# Patient Record
Sex: FEMALE | Race: BLACK OR AFRICAN AMERICAN
[De-identification: names, ages, dates, MRNs, and addresses within clinical notes are randomized per-mention and may not be internally consistent; named-entity substitution may affect disease eponyms.]

---

## 2017-01-30 ENCOUNTER — HOSPITAL ENCOUNTER (EMERGENCY)
Dept: HOSPITAL 17 - HOBED | Age: 42
Discharge: HOME | End: 2017-01-30
Payer: COMMERCIAL

## 2017-01-30 VITALS — SYSTOLIC BLOOD PRESSURE: 121 MMHG | HEART RATE: 85 BPM | DIASTOLIC BLOOD PRESSURE: 67 MMHG

## 2017-01-30 DIAGNOSIS — R10.30: ICD-10-CM

## 2017-01-30 DIAGNOSIS — M54.5: ICD-10-CM

## 2017-01-30 DIAGNOSIS — Z3A.31: ICD-10-CM

## 2017-01-30 DIAGNOSIS — O26.893: Primary | ICD-10-CM

## 2017-01-30 LAB
COLOR UR: YELLOW
COMMENT (UR): (no result)
CULTURE IF INDICATED: (no result)
GLUCOSE UR STRIP-MCNC: 70 MG/DL
HGB UR QL STRIP: (no result)
KETONES UR STRIP-MCNC: (no result) MG/DL
MUCOUS THREADS #/AREA URNS LPF: (no result) /LPF
NITRITE UR QL STRIP: (no result)
SP GR UR STRIP: 1.02 (ref 1–1.03)
SQUAMOUS #/AREA URNS HPF: 1 /HPF (ref 0–5)

## 2017-01-30 PROCEDURE — 81001 URINALYSIS AUTO W/SCOPE: CPT

## 2017-01-30 PROCEDURE — 96372 THER/PROPH/DIAG INJ SC/IM: CPT

## 2017-01-30 PROCEDURE — 99284 EMERGENCY DEPT VISIT MOD MDM: CPT

## 2017-01-30 NOTE — PD
HPI


Chief Complaint


Abdominal and back pain


Date Seen:  2017


Travel History


International Travel<30 Days:  No


Contact w/Intl Traveler<30Days:  No





History of Present Illness


HPI


This patient is 41-year-old black female  at 31 weeks patient Dr. brown 

presents complaining of abdominal and back pain.  She denies bleeding or 

rupture the membranes baby is active she is not tristen


Para:  1


:  4





History


Obstetric History


Obstetric History


One vaginal delivery 2 pregnancy losses





Family History


Family History:  Negative





Social History


Alcohol Use:  No


Tobacco Use:  No


Substance Abuse:  No





Allergies-Medications


(Allergen,Severity, Reaction):  


Coded Allergies:  


     Thorazine (Verified  Allergy, Severe, DYSTONIC, 16)


     Compazine (Verified  Adverse Reaction, Severe, DYSTONIC, 16)


Home Meds


Reported Medications


Meclizine 25 Mg Chew25 Mg CHEW AS DIRECTED PRN (VERTIGO)  Ref 0


   16


Gabapentin 100 Mg Tik167 Mg PO BID  #60 CAP  Ref 0


   16





Review of Systems


General / Constitutional:  No: Fever, Weight Gain, Chills, Other


Eyes:  No: Diploplia, Blurred Vision, Visual changes, Pain, Photophobia


HENT:  No: Headaches, Vertigo, Lightheadedness


Cardiovascular:  No: Irregular Rhythm, Chest Pain or Discomfort, Palpitations, 

Tachycardia, Syncope, Varicosities, Edema, Cyanosis


Respiratory:  No: Cough, Short of Breath, Other


Gastrointestinal:  Abdominal Pain,  No: Nausea, Vomiting, Diarrhea


Genitourinary:  Pelvic Pain,  No: Decreased Urinary Output, Oliguria


Musculoskeletal:  No: Limited ROM, Weakness, Cramping, Edema, Pain


Skin:  No Rash, No Itching, No Dryness, No Lumps, No Change in Pigmentation, No 

Change in Nails, No Alopecia, No Lesions


Neurologic:  No: Weakness, Dizziness, Syncope, Focal Abnormalities, 

Coordination Problem, Headache, Slurred Speech, Seizures


Psychiatric:  No: Depression, Suicidal Ideations, Homicidal Ideation


Endocrine:  No: Heat Intolerance, Cold Intolerance, Polydipsia, Polyuria, Other





Physical Exam


Narrative


GENERAL: Well-nourished, well-developed patient.


SKIN: Warm and dry.


HEAD: Normocephalic and atraumatic.


EYES: No scleral icterus. No injection or drainage. 


ENT: No nasal drainage noted. Mucous membranes pink. Airway patent.


NECK: Supple, trachea midline. No JVD.


CARDIOVASCULAR: Regular rate and rhythm without murmurs, gallops, or rubs. 


RESPIRATORY: Breath sounds equal bilaterally. No accessory muscle use.


BREASTS: Bilateral exam showed no masses , no retractions, no nipple discharge.


ABDOMEN/GI: Abdomen soft, non-tender, bowel sounds present, no rebound, no 

guarding 


   Gravid to [31-] weeks size


   Fundal Height: [30-]


GENITOURINARY: 


   External Genitalia: intact and normal in appearance


   BUS glands: [-]


   Cervix: [-Closed]


   Dilatation: [-] Closed          


   Effacement: [-]  Thick        


   Station: [-3]  


   Presentation: [-vtx]        


   Membranes: [intact  ]


   Uterine Contractions: [none-]


FHT's: 


   Category: [-1]   


   Baseline: [-144]   


   Reactive: [-yes]   


   Variability: [mod-]  


   Decels: [-none]  


EXTREMITIES: No cyanosis or edema.


BACK: Nontender without obvious deformity. No CVA tenderness.


NEUROLOGICAL: Awake and alert. Motor and sensory grossly within normal limits. 

Five out of 5 muscle strength in all muscle groups. Normal speech.





Data


Data


Orders





 Vital Signs (Adult) .ON ADMISSION (17 13:05)


^ Labor Status (17 13:05)


Urinalysis - C+S If Indicated (17 13:05)


Diet Liquid (17 Lunch)


Meperidine Inj (Demerol Inj) (17 13:15)


Promethazine Inj (Phenergan Inj) (17 13:15)


Labs





Laboratory Tests








Test 17





 13:10


 


Urine Color YELLOW 


 


Urine Turbidity CLEAR 


 


Urine pH 6.0 


 


Urine Specific Gravity 1.023 


 


Urine Protein TRACE 


 


Urine Glucose (UA) 70 


 


Urine Ketones NEG 


 


Urine Occult Blood NEG 


 


Urine Nitrite NEG 


 


Urine Bilirubin NEG 


 


Urine Urobilinogen LESS THAN 2.0 


 


Urine Leukocyte Esterase NEG 


 


Urine RBC 1 


 


Urine WBC 2 


 


Urine Squamous Epithelial 1 





Cells 


 


Urine Mucus FEW 


 


Microscopic Urinalysis Comment CULT NOT





 INDICATED











MDM


Interpretation(s)


This patient is a 31 week gestation  with lower abdominal pain and low 

back pain.  She has no CVA tenderness her urine is clear she has no 

contractions , fetal heart rate tracing is reactive


Plan


This patient's pain is likely muscular skeletal in origin as she has been 

working ongoing and needs several days of bedrest.  She is also given IM 

Demerol Phenergan for pain provide a work release


Diagnosis


Diagnosis:  


 Primary Impression:  


 Abdominal pain during pregnancy in third trimester


Disposition:  01 DISCHARGE HOME


Condition:  Stable


Departure Forms:  Work Release








Corona Becerra II, MD 2017 14:01

## 2017-03-28 ENCOUNTER — HOSPITAL ENCOUNTER (INPATIENT)
Dept: HOSPITAL 17 - H2EB | Age: 42
LOS: 4 days | Discharge: HOME | End: 2017-04-01
Payer: COMMERCIAL

## 2017-03-28 DIAGNOSIS — E66.9: ICD-10-CM

## 2017-03-28 DIAGNOSIS — G40.909: ICD-10-CM

## 2017-03-28 DIAGNOSIS — Z3A.40: ICD-10-CM

## 2017-03-28 DIAGNOSIS — O09.529: ICD-10-CM

## 2017-03-28 PROCEDURE — 86900 BLOOD TYPING SEROLOGIC ABO: CPT

## 2017-03-28 PROCEDURE — 85025 COMPLETE CBC W/AUTO DIFF WBC: CPT

## 2017-03-28 PROCEDURE — 81001 URINALYSIS AUTO W/SCOPE: CPT

## 2017-03-28 PROCEDURE — 90715 TDAP VACCINE 7 YRS/> IM: CPT

## 2017-03-28 PROCEDURE — 59025 FETAL NON-STRESS TEST: CPT

## 2017-03-28 PROCEDURE — 86850 RBC ANTIBODY SCREEN: CPT

## 2017-03-28 PROCEDURE — 86901 BLOOD TYPING SEROLOGIC RH(D): CPT

## 2017-03-28 PROCEDURE — 80048 BASIC METABOLIC PNL TOTAL CA: CPT

## 2017-03-28 PROCEDURE — 90707 MMR VACCINE SC: CPT

## 2017-03-28 NOTE — MH
cc:

NICK ENAMORADO M.D.

****

 

DATE OF ADMISSION

3/29/2017

 

DATE OF BIRTH

1975

 

ADMISSION DIAGNOSES

1. Term pregnancy.

2. Advanced maternal age, 41.

3. Short stature was cephalopelvic disporportion.

 

HISTORY OF PRESENT ILLNESS

The patient is 41-year-old single, black female para 1-0-3-1 with LMP of

2016, EDC of 2017. Early ultrasound at 8 weeks agrees with  EDC.

She had first delivery  weighing 5 pounds that was difficult. She had a

spontaneous  and two ETPs.  At term the EFW of this baby is about 2

pounds greater then  her first. She has a height of 4 feet 11 inches, small 
pelvimetry obesity.

She is now admitted primary .

 

PAST MEDICAL HISTORY

1. Previous surgery in  she had C. difficile colitis.

2. Has a history of seizure disorder.

 

MEDICATIONS

1. Gabapentin.

2. Meclizine.

 

ALLERGIES

COMPAZINE AND THORAZINE.

 

TRANSFUSIONS

None.

 

SOCIAL HISTORY

She is a teacher, elementary school. Alcohol, tobacco, drugs are none.

 

PHYSICAL EXAMINATION

GENERAL:  Well-nourished, well-developed, white female.

VITAL SIGNS: Stable. Height is 4'11", weight is 194 pounds.

HEENT:  Examination is normal.

CHEST:  Clear.

HEART:  Regular rate.

BREASTS:  Symmetrical.

ABDOMEN:  Gravid. Estimated fetal weight is 3400 grams.

PELVIC:   Cervix is long, thick and closed. Vertex is ballotable.

ASSESSMENT

As above.

 

PLAN

She is now admitted for a primary . While in the office I explained

the procedures, the risks, benefits and complications. The patient would like

to proceed.

 

 

 

 

                               __________________________________

                           MD CORY Mary/HATTIE

D:  3/28/2017/7:29 PM

T:  3/28/2017/7:43 PM

Visit #:  [id number]

Job #:  [id number]

TREY

## 2017-03-29 VITALS
DIASTOLIC BLOOD PRESSURE: 44 MMHG | RESPIRATION RATE: 16 BRPM | SYSTOLIC BLOOD PRESSURE: 122 MMHG | HEART RATE: 92 BPM | OXYGEN SATURATION: 98 %

## 2017-03-29 VITALS
HEART RATE: 82 BPM | RESPIRATION RATE: 16 BRPM | DIASTOLIC BLOOD PRESSURE: 74 MMHG | OXYGEN SATURATION: 99 % | SYSTOLIC BLOOD PRESSURE: 145 MMHG

## 2017-03-29 VITALS
RESPIRATION RATE: 15 BRPM | HEART RATE: 78 BPM | DIASTOLIC BLOOD PRESSURE: 77 MMHG | OXYGEN SATURATION: 98 % | SYSTOLIC BLOOD PRESSURE: 143 MMHG

## 2017-03-29 VITALS
RESPIRATION RATE: 18 BRPM | HEART RATE: 85 BPM | OXYGEN SATURATION: 97 % | DIASTOLIC BLOOD PRESSURE: 63 MMHG | SYSTOLIC BLOOD PRESSURE: 120 MMHG

## 2017-03-29 VITALS
SYSTOLIC BLOOD PRESSURE: 102 MMHG | RESPIRATION RATE: 18 BRPM | DIASTOLIC BLOOD PRESSURE: 59 MMHG | HEART RATE: 85 BPM | OXYGEN SATURATION: 97 %

## 2017-03-29 VITALS
HEART RATE: 101 BPM | TEMPERATURE: 97.8 F | DIASTOLIC BLOOD PRESSURE: 69 MMHG | RESPIRATION RATE: 18 BRPM | SYSTOLIC BLOOD PRESSURE: 148 MMHG | OXYGEN SATURATION: 99 %

## 2017-03-29 VITALS — TEMPERATURE: 97.4 F

## 2017-03-29 LAB
BACTERIA #/AREA URNS HPF: (no result) /HPF
BASOPHILS # BLD AUTO: 0 TH/MM3 (ref 0–0.2)
BASOPHILS NFR BLD: 0.4 % (ref 0–2)
COLOR UR: YELLOW
COMMENT (UR): (no result)
CULTURE IF INDICATED: (no result)
EOSINOPHIL # BLD: 0.2 TH/MM3 (ref 0–0.4)
EOSINOPHIL NFR BLD: 1.6 % (ref 0–4)
ERYTHROCYTE [DISTWIDTH] IN BLOOD BY AUTOMATED COUNT: 15.5 % (ref 11.6–17.2)
GLUCOSE UR STRIP-MCNC: (no result) MG/DL
HCT VFR BLD CALC: 36.4 % (ref 35–46)
HEMO FLAGS: (no result)
HGB UR QL STRIP: (no result)
KETONES UR STRIP-MCNC: (no result) MG/DL
LYMPHOCYTES # BLD AUTO: 1.7 TH/MM3 (ref 1–4.8)
LYMPHOCYTES NFR BLD AUTO: 17 % (ref 9–44)
MCH RBC QN AUTO: 27.1 PG (ref 27–34)
MCHC RBC AUTO-ENTMCNC: 32.2 % (ref 32–36)
MCV RBC AUTO: 84.3 FL (ref 80–100)
MONOCYTES NFR BLD: 9.5 % (ref 0–8)
MUCOUS THREADS #/AREA URNS LPF: (no result) /LPF
NEUTROPHILS # BLD AUTO: 6.9 TH/MM3 (ref 1.8–7.7)
NEUTROPHILS NFR BLD AUTO: 71.5 % (ref 16–70)
NITRITE UR QL STRIP: (no result)
PLATELET # BLD: 288 TH/MM3 (ref 150–450)
RBC # BLD AUTO: 4.32 MIL/MM3 (ref 4–5.3)
SP GR UR STRIP: 1.01 (ref 1–1.03)
SQUAMOUS #/AREA URNS HPF: 2 /HPF (ref 0–5)
WBC # BLD AUTO: 9.7 TH/MM3 (ref 4–11)

## 2017-03-29 RX ADMIN — ACETAMINOPHEN SCH MG: 10 INJECTION, SOLUTION INTRAVENOUS at 22:04

## 2017-03-29 RX ADMIN — ACETAMINOPHEN SCH MG: 10 INJECTION, SOLUTION INTRAVENOUS at 14:15

## 2017-03-30 VITALS
SYSTOLIC BLOOD PRESSURE: 111 MMHG | RESPIRATION RATE: 16 BRPM | HEART RATE: 65 BPM | DIASTOLIC BLOOD PRESSURE: 77 MMHG | TEMPERATURE: 98.6 F

## 2017-03-30 VITALS
RESPIRATION RATE: 18 BRPM | HEART RATE: 64 BPM | TEMPERATURE: 98 F | SYSTOLIC BLOOD PRESSURE: 117 MMHG | DIASTOLIC BLOOD PRESSURE: 82 MMHG

## 2017-03-30 LAB
ANION GAP SERPL CALC-SCNC: 9 MEQ/L (ref 5–15)
BASOPHILS # BLD AUTO: 0 TH/MM3 (ref 0–0.2)
BASOPHILS NFR BLD: 0.3 % (ref 0–2)
BUN SERPL-MCNC: 3 MG/DL (ref 7–18)
CHLORIDE SERPL-SCNC: 105 MEQ/L (ref 98–107)
EOSINOPHIL # BLD: 0.1 TH/MM3 (ref 0–0.4)
EOSINOPHIL NFR BLD: 0.8 % (ref 0–4)
ERYTHROCYTE [DISTWIDTH] IN BLOOD BY AUTOMATED COUNT: 16.1 % (ref 11.6–17.2)
GFR SERPLBLD BASED ON 1.73 SQ M-ARVRAT: 166 ML/MIN (ref 89–?)
HCO3 BLD-SCNC: 24.2 MEQ/L (ref 21–32)
HCT VFR BLD CALC: 30.3 % (ref 35–46)
HEMO FLAGS: (no result)
LYMPHOCYTES # BLD AUTO: 1.8 TH/MM3 (ref 1–4.8)
LYMPHOCYTES NFR BLD AUTO: 13.4 % (ref 9–44)
MCH RBC QN AUTO: 27.7 PG (ref 27–34)
MCHC RBC AUTO-ENTMCNC: 32.8 % (ref 32–36)
MCV RBC AUTO: 84.3 FL (ref 80–100)
MONOCYTES NFR BLD: 8.3 % (ref 0–8)
NEUTROPHILS # BLD AUTO: 10.5 TH/MM3 (ref 1.8–7.7)
NEUTROPHILS NFR BLD AUTO: 77.2 % (ref 16–70)
PLATELET # BLD: 243 TH/MM3 (ref 150–450)
POTASSIUM SERPL-SCNC: 3.4 MEQ/L (ref 3.5–5.1)
RBC # BLD AUTO: 3.59 MIL/MM3 (ref 4–5.3)
SODIUM SERPL-SCNC: 138 MEQ/L (ref 136–145)
WBC # BLD AUTO: 13.6 TH/MM3 (ref 4–11)

## 2017-03-30 RX ADMIN — GABAPENTIN SCH MG: 100 CAPSULE ORAL at 17:56

## 2017-03-30 RX ADMIN — GABAPENTIN SCH MG: 100 CAPSULE ORAL at 13:00

## 2017-03-30 RX ADMIN — GABAPENTIN SCH MG: 100 CAPSULE ORAL at 09:12

## 2017-03-30 RX ADMIN — STANDARDIZED SENNA CONCENTRATE AND DOCUSATE SODIUM PRN TAB: 8.6; 5 TABLET, FILM COATED ORAL at 04:06

## 2017-03-30 RX ADMIN — IBUPROFEN PRN MG: 600 TABLET, FILM COATED ORAL at 11:30

## 2017-03-30 RX ADMIN — STANDARDIZED SENNA CONCENTRATE AND DOCUSATE SODIUM PRN TAB: 8.6; 5 TABLET, FILM COATED ORAL at 18:11

## 2017-03-30 RX ADMIN — IBUPROFEN PRN MG: 600 TABLET, FILM COATED ORAL at 17:58

## 2017-03-30 RX ADMIN — IBUPROFEN PRN MG: 600 TABLET, FILM COATED ORAL at 04:05

## 2017-03-30 RX ADMIN — ACETAMINOPHEN SCH MG: 10 INJECTION, SOLUTION INTRAVENOUS at 05:16

## 2017-03-30 RX ADMIN — OXYCODONE HYDROCHLORIDE AND ACETAMINOPHEN PRN TAB: 5; 325 TABLET ORAL at 23:02

## 2017-03-30 RX ADMIN — OXYCODONE HYDROCHLORIDE AND ACETAMINOPHEN PRN TAB: 5; 325 TABLET ORAL at 18:00

## 2017-03-31 VITALS
TEMPERATURE: 98.4 F | HEART RATE: 76 BPM | RESPIRATION RATE: 20 BRPM | SYSTOLIC BLOOD PRESSURE: 128 MMHG | DIASTOLIC BLOOD PRESSURE: 86 MMHG

## 2017-03-31 RX ADMIN — OXYCODONE HYDROCHLORIDE AND ACETAMINOPHEN PRN TAB: 5; 325 TABLET ORAL at 03:57

## 2017-03-31 RX ADMIN — STANDARDIZED SENNA CONCENTRATE AND DOCUSATE SODIUM PRN TAB: 8.6; 5 TABLET, FILM COATED ORAL at 20:56

## 2017-03-31 RX ADMIN — OXYCODONE HYDROCHLORIDE AND ACETAMINOPHEN PRN TAB: 5; 325 TABLET ORAL at 08:15

## 2017-03-31 RX ADMIN — OXYCODONE HYDROCHLORIDE AND ACETAMINOPHEN PRN TAB: 5; 325 TABLET ORAL at 16:09

## 2017-03-31 RX ADMIN — GABAPENTIN SCH MG: 100 CAPSULE ORAL at 07:20

## 2017-03-31 RX ADMIN — OXYCODONE HYDROCHLORIDE AND ACETAMINOPHEN PRN TAB: 5; 325 TABLET ORAL at 20:57

## 2017-03-31 RX ADMIN — STANDARDIZED SENNA CONCENTRATE AND DOCUSATE SODIUM PRN TAB: 8.6; 5 TABLET, FILM COATED ORAL at 03:56

## 2017-03-31 RX ADMIN — IBUPROFEN PRN MG: 600 TABLET, FILM COATED ORAL at 16:08

## 2017-03-31 RX ADMIN — OXYCODONE HYDROCHLORIDE AND ACETAMINOPHEN PRN TAB: 5; 325 TABLET ORAL at 12:04

## 2017-03-31 RX ADMIN — GABAPENTIN SCH MG: 100 CAPSULE ORAL at 08:16

## 2017-03-31 RX ADMIN — IBUPROFEN PRN MG: 600 TABLET, FILM COATED ORAL at 09:45

## 2017-03-31 RX ADMIN — GABAPENTIN SCH MG: 100 CAPSULE ORAL at 17:46

## 2017-03-31 RX ADMIN — IBUPROFEN PRN MG: 600 TABLET, FILM COATED ORAL at 03:57

## 2017-04-01 VITALS
SYSTOLIC BLOOD PRESSURE: 138 MMHG | TEMPERATURE: 97.8 F | RESPIRATION RATE: 18 BRPM | HEART RATE: 67 BPM | DIASTOLIC BLOOD PRESSURE: 85 MMHG

## 2017-04-01 RX ADMIN — OXYCODONE HYDROCHLORIDE AND ACETAMINOPHEN PRN TAB: 5; 325 TABLET ORAL at 01:17

## 2017-04-01 RX ADMIN — IBUPROFEN PRN MG: 600 TABLET, FILM COATED ORAL at 01:17

## 2017-04-01 RX ADMIN — IBUPROFEN PRN MG: 600 TABLET, FILM COATED ORAL at 08:03

## 2017-04-01 RX ADMIN — IBUPROFEN PRN MG: 600 TABLET, FILM COATED ORAL at 16:04

## 2017-04-01 RX ADMIN — OXYCODONE HYDROCHLORIDE AND ACETAMINOPHEN PRN TAB: 5; 325 TABLET ORAL at 05:14

## 2017-04-01 RX ADMIN — OXYCODONE HYDROCHLORIDE AND ACETAMINOPHEN PRN TAB: 5; 325 TABLET ORAL at 11:08

## 2017-04-01 RX ADMIN — OXYTOCIN SCH MLS/HR: 10 INJECTION, SOLUTION INTRAMUSCULAR; INTRAVENOUS at 15:05

## 2017-04-01 RX ADMIN — OXYTOCIN SCH MLS/HR: 10 INJECTION, SOLUTION INTRAMUSCULAR; INTRAVENOUS at 08:25

## 2017-04-01 NOTE — HHI.DCPOC
Discharge Care Plan


Report Symptoms to Your Doctor


-Temperate above 100.5 degrees


-Redness, of incision or excessive or foul smelling drainage


-Unusual pain or calf pain


-Increased vaginal bleeding


-Painful or difficulty urinating


-Feelings of extreme sadness or anxiety after 2 weeks


Goals to Promote Your Health


* To prevent worsening of your condition and complications


* To maintain your health at the optimal level


Directions to Meet Your Goals


*** Take your medications as prescribed


*** Follow your dietary instruction


*** Follow activity as directed


*** Ensure plenty of rest for recovery


*** Drink fluids for hydration








*** Keep your appointments as scheduled


*** Take your immunizations and boosters as scheduled


*** If your symptoms worsen call your PCP, if no PCP go to Urgent Care Center 

or Emergency Room***


*** Smoking is Dangerous to Your Health. Avoid second hand smoke***


***Call the 24-hour crisis hotline for domestic abuse at 1-651.836.7737***








Eliot Mcdowell MD Apr 1, 2017 12:39

## 2017-04-02 ENCOUNTER — HOSPITAL ENCOUNTER (EMERGENCY)
Dept: HOSPITAL 17 - HOBED | Age: 42
Discharge: HOME | End: 2017-04-02
Payer: COMMERCIAL

## 2017-04-02 LAB
ALP SERPL-CCNC: 138 U/L (ref 45–117)
ALT SERPL-CCNC: 31 U/L (ref 10–53)
ANION GAP SERPL CALC-SCNC: 7 MEQ/L (ref 5–15)
AST SERPL-CCNC: 40 U/L (ref 15–37)
BILIRUB SERPL-MCNC: 0.2 MG/DL (ref 0.2–1)
BUN SERPL-MCNC: 3 MG/DL (ref 7–18)
CHLORIDE SERPL-SCNC: 106 MEQ/L (ref 98–107)
GFR SERPLBLD BASED ON 1.73 SQ M-ARVRAT: 143 ML/MIN (ref 89–?)
HCO3 BLD-SCNC: 27.8 MEQ/L (ref 21–32)
POTASSIUM SERPL-SCNC: 3.5 MEQ/L (ref 3.5–5.1)
SODIUM SERPL-SCNC: 141 MEQ/L (ref 136–145)
URATE SERPL-MCNC: 3.5 MG/DL (ref 2.6–6)

## 2017-04-02 PROCEDURE — 93970 EXTREMITY STUDY: CPT

## 2017-04-02 PROCEDURE — 84550 ASSAY OF BLOOD/URIC ACID: CPT

## 2017-04-02 PROCEDURE — 80053 COMPREHEN METABOLIC PANEL: CPT

## 2017-04-02 PROCEDURE — 99284 EMERGENCY DEPT VISIT MOD MDM: CPT

## 2017-04-02 NOTE — RADRPT
EXAM DATE/TIME:  2017 04:00 

 

[facility] COMPARISON:     

No previous studies available for comparison.

        

 

 

INDICATIONS :                

Bilateral leg swelling. 

            

 

MEDICAL HISTORY :        

Seizures. Gastritis. 

 

SURGICAL HISTORY :     

 section. Right hand surgery. 

 

ENCOUNTER:     

Initial

 

ACUITY:     

1 day

 

PAIN SCORE:      

5/10

 

LOCATION:      

Bilateral  legs. 

                       

 

TECHNIQUE:     

Venous ultrasound of the left and right leg was performed from the inguinal ligament to the proximal 
calf.  Real-time, color Doppler and spectral tracing, compression and augmentation techniques were us
ed.  

 

FINDINGS:     

 

RIGHT LEG:     

There is normal compressibility of the deep venous system from the inguinal region to the proximal ca
lf.  No echogenic clot is seen in the lumen of the common femoral, femoral, popliteal, and posterior 
tibial veins.  There is a normal response of the venous system to proximal and distal augmentation an
d respiration.  

 

LEFT LEG:     

There is normal compressibility of the deep venous system from the inguinal region to the proximal ca
lf.  No echogenic clot is seen in the lumen of the common femoral, femoral, popliteal, and posterior 
tibial veins.  There is a normal response of the venous system to proximal and distal augmentation an
d respiration.  

 

CONCLUSION:     

No DVT is identified within either lower extremity.

 

 

 

 Eliot López MD on 2017 at 4:42           

Board Certified Radiologist.

 This report was verified electronically.

## 2017-04-02 NOTE — PD
HPI


Chief Complaint


Swelling, and pain in her calves


Date Seen:  2017


Travel History


International Travel<30 Days:  No


Contact w/Intl Traveler<30Days:  No


Known Affected Area:  No





History of Present Illness


HPI


41-year-old black female who is 1 week post  by Dr. brown presents now 

to the emergency room with bilateral calf and thigh pain and swelling in her 

legs and buttocks borderline blood pressure elevation


Para:  2


:  2





History


Obstetric History


Obstetric History


2 C-sections





Past Surgical History


*** Narrative Surgical


2 C-sections





Social History


Alcohol Use:  No


Tobacco Use:  No


Substance Abuse:  No





Allergies-Medications


(Allergen,Severity, Reaction):  


Coded Allergies:  


     Thorazine (Verified  Allergy, Severe, DYSTONIC, 17)


     Compazine (Verified  Adverse Reaction, Severe, DYSTONIC, 17)


Home Meds


Reported Medications


Meclizine 25 Mg Chew25 Mg CHEW AS DIRECTED PRN (VERTIGO)  Ref 0


   3/29/17


Gabapentin 100 Mg [id number] Mg PO TID  #90 CAP  Ref 0


   3/29/17


Discontinued Reported Medications


Gabapentin 100 Mg Eto343 Mg PO BID  #60 CAP  Ref 0


   16





Review of Systems


General / Constitutional:  No: Fever, Weight Gain, Chills, Other


Eyes:  No: Diploplia, Blurred Vision, Visual changes, Pain, Photophobia


HENT:  No: Headaches, Vertigo, Lightheadedness


Cardiovascular:  Edema,  No: Irregular Rhythm, Chest Pain or Discomfort, 

Palpitations, Tachycardia, Syncope, Varicosities, Cyanosis


Respiratory:  No: Cough, Short of Breath, Other


Gastrointestinal:  No: Nausea, Vomiting, Diarrhea


Genitourinary:  No: Decreased Urinary Output, Oliguria


Musculoskeletal:  Edema, Pain,  No: Limited ROM, Weakness, Cramping


Skin:  No Rash, No Itching, No Dryness, No Lumps, No Change in Pigmentation, No 

Change in Nails, No Alopecia, No Lesions


Neurologic:  No: Weakness, Dizziness, Syncope, Focal Abnormalities, 

Coordination Problem, Headache, Slurred Speech, Seizures


Psychiatric:  No: Depression, Suicidal Ideations, Homicidal Ideation


Endocrine:  No: Heat Intolerance, Cold Intolerance, Polydipsia, Polyuria, Other





Physical Exam


Narrative


GENERAL: Well-nourished, well-developed patient.


SKIN: Warm and dry.


HEAD: Normocephalic and atraumatic.


EYES: No scleral icterus. No injection or drainage. 


ENT: No nasal drainage noted. Mucous membranes pink. Airway patent.


NECK: Supple, trachea midline. No JVD.


CARDIOVASCULAR: Regular rate and rhythm without murmurs, gallops, or rubs. 


RESPIRATORY: Breath sounds equal bilaterally. No accessory muscle use.


BREASTS: Bilateral exam showed no masses , no retractions, no nipple discharge.


ABDOMEN/GI: Abdomen soft, -tender, bowel sounds present, no rebound, no 

guarding uterus at umb


   


  : [-]  


EXTREMITIES: No cyanosis,  3+ edema. calves are swollen and tender, no homans





 





 


BACK: Nontender without obvious deformity. No CVA tenderness.


NEUROLOGICAL: Awake and alert. Motor and sensory grossly within normal limits. 

Five out of 5 muscle strength in all muscle groups. Normal speech.





Data


Data


Orders





 Activity Bed Rest (17 03:20)


^ Notify Dr. Hawkins (17 03:20)


Cbc No Diff, Includes Plts (4/3/17 06:00)


Comprehensive Metabolic Panel (17 03:20)


Uric Acid (17 03:20)


Urinalysis - C+S If Indicated (17 03:20)


Us Leg Venous Doppler Bilat (17 )


Labs


venous dopplers neg for DVT


Laboratory Tests








Test 17





 03:45


 


Sodium Level 141 


 


Potassium Level 3.5 


 


Chloride Level 106 


 


Carbon Dioxide Level 27.8 


 


Anion Gap 7 


 


Blood Urea Nitrogen 3 


 


Creatinine 0.57 


 


Estimat Glomerular Filtration 143 





Rate 


 


Random Glucose 87 


 


Uric Acid 3.5 


 


Calcium Level 8.6 


 


Total Bilirubin 0.2 


 


Aspartate Amino Transf 40 





(AST/SGOT) 


 


Alanine Aminotransferase 31 





(ALT/SGPT) 


 


Alkaline Phosphatase 138 


 


Total Protein 6.5 


 


Albumin 2.4 











MDM


Interpretation(s)


This patient is a 41-year-old black female  is 1 week status post C-

section.  She presented with calf pain swelling edema ,swelling in her hips and 

legs, borderline blood pressures.  All [facility] labs within normal limits, she had 

bilateral venous Dopplers of her legs from groin to the calf in all negative 

for clot  or DVT.  Dr. brown her  a week ago and he knows about her 

events this evening and increase it to she may be able to go home as long as 

all her workup is negative


Plan


Plan for patient to be discharged on HCTZ for swelling to try and mobilize some 

of the fluid in the diurese that, she has an appointment schedule see Dr. brown 

the near future


Diagnosis


Diagnosis:  


 Primary Impression:  


 Edema during pregnancy, delivered, with postpartum complication


Disposition:  01 DISCHARGE HOME


Condition:  Stable


Scripts


Hydrochlorothiazide 50 Mg Tab50 Mg PO DAILY  #60 TAB  Ref 0


   Prov:Coroan Becerra II, MD         17


Patient Instructions:  General Instructions





**Additional Instructions**:


KEEP FOLLOWUP APPOINTMENT WITH PRIMARY CARE GIVER


Departure Forms:  Tests/Procedures








Corona Becerra II, MD 2017 06:11

## 2017-04-03 NOTE — MP
cc:

NICK ENAMORADO M.D.

****

 

DATE OF SURGERY

3/29/17

 

PREOPERATIVE DIAGNOSIS

1.  Term pregnancy

2.  Advanced maternal age 41

3.  Sinusitis portion with short stature a small pelvimetry

 

POSTOPERATIVE DIAGNOSIS

1.  Term pregnancy

2.  Advanced maternal age 41

3.  Sinusitis portion with short stature a small pelvimetry

4.  Plus delivered plus occiput posterior

 

PROCEDURE

Primary low transverse  section.

 

ANESTHESIA

Spinal

 

SURGEON

LUIS Enamorado MD

 

FIRST ASSISTANT

ALETA Kam.

 

ESTIMATED BLOOD LOSS

500 mL

 

FLUIDS

1.2 liters crystalloid.

 

OBJECTIVE FINDINGS

Following induction of adequate spinal anesthesia, the patient was prepped and

draped supine on the operating table left lateral tilt position usual sterile

fashion with the bladder being drained by Veliz catheterization.  The abdomen

was opened through a Pfannenstiel incision using a knife to cut down through

from the skin to the fascia.  The fascia opened transversely and stripped from

the muscles.  Rectus muscle split in the midline and the peritoneum opened

sharp without incident.   The bladder flap was taken down sharply and retracted

inferiorly with the Soraida blade.  The lower uterine segment was incised

transversely with a knife and extended with blunt dissection.  Membranes

ruptured revealing clear fluid.  Baby was in  LOP position, gently rotated. The

vacuum applied to the occiput  and used to lift the head through the abdominal

wound.  Mouth was suctioned, cord clamped and cut.  Baby passed to the 

team, viable vigorous female Apgars 9 and 9, birth weight 6 pounds 13 ounces.

Cord blood sent for typing, placenta manually removed and uterine cavity

cleaned with laps. Uterus was exteriorized and closed in two layers with

running suture, first with running locking stitch of 0 Vicryl, second running

imbricating stitch of Vicryl.  Posterior inspection of the uterus, tubes and

ovaries normal.  Uterus  now placed in cavity.  Irrigation was performed, no

bleeding was evident and the bladder flap was closed with running stitch 3-0

Vicryl.  All laps and retractors removed.  Counts were correct.  The anterior

peritoneum closed running stitch of 2-0 Vicryl.  The fascia closed with running

locking stitch of 0 Vicryl corner to midline and tied, subcu with runnin 3-0

Vicryl and skin with subcuticular Monocryl.  Dermabond applied.  Counts

correct.  The patient was awaken and taken to recovery room in good condition.

 

 

 

 

 

                              _________________________________

                              MD CORY Mary/

D:  3/29/2017/3:12 PM

T:  4/3/2017/8:55 AM

Visit #:  [id number]

Job #:  [id number]

TREY

## 2018-01-29 ENCOUNTER — HOSPITAL ENCOUNTER (EMERGENCY)
Dept: HOSPITAL 17 - NEPD | Age: 43
Discharge: HOME | End: 2018-01-29
Payer: COMMERCIAL

## 2018-01-29 VITALS — WEIGHT: 176.37 LBS | BODY MASS INDEX: 35.56 KG/M2 | HEIGHT: 59 IN

## 2018-01-29 VITALS
HEART RATE: 73 BPM | DIASTOLIC BLOOD PRESSURE: 85 MMHG | TEMPERATURE: 98.2 F | SYSTOLIC BLOOD PRESSURE: 141 MMHG | RESPIRATION RATE: 18 BRPM | OXYGEN SATURATION: 99 %

## 2018-01-29 DIAGNOSIS — J10.1: Primary | ICD-10-CM

## 2018-01-29 PROCEDURE — 99283 EMERGENCY DEPT VISIT LOW MDM: CPT

## 2018-01-29 NOTE — PD
HPI


Chief Complaint:  Cold / Flu Symptoms


Time Seen by Provider:  08:30


Travel History


International Travel<30 days:  No


Contact w/Intl Traveler<30days:  No


Traveled to known affect area:  No





History of Present Illness


HPI


42-year-old Afro-American female presents the emergency department with flulike 

symptoms over the past 24 hours.  She's had achy chills, sore throat, upper 

respiratory congestion, cough, and fevers.  Patient is a schoolteacher, and her 

significant other had H1N1 2 weeks ago.  Patient denies nausea vomiting or 

diarrhea.  She is allergic to chlorpromethazine and prochlorperazine.





PFSH


Past Medical History


Cancer:  No


Cardiovascular Problems:  No


Diminished Hearing:  No


Endocrine:  No


Gastrointestinal Disorders:  Yes (gastritis)


Genitourinary:  No


Immune Disorder:  No


Implanted Vascular Access Dvce:  No


Musculoskeletal:  No


Neurologic:  Yes (seizures )


Psychiatric:  No


Reproductive:  No


Respiratory:  No


Immunizations Current:  No


Migraines:  Yes


Seizures:  Yes


Pregnant?:  Not Pregnant


:  3


Para:  1


Miscarriage:  1


:  1


Dilation and Curettage (D&C):  Yes





Past Surgical History


Pacemaker:  No


Other Surgery:  Yes (RT HAND SURGERY)





Social History


Alcohol Use:  No


Tobacco Use:  No


Substance Use:  No





Allergies-Medications


(Allergen,Severity, Reaction):  


Coded Allergies:  


     chlorpromazine (Unverified  Allergy, Severe, DYSTONIC, 18)


     prochlorperazine (Unverified  Adverse Reaction, Severe, DYSTONIC, 18)


Reported Meds & Prescriptions





Reported Meds & Active Scripts


Active


Tamiflu (Oseltamivir Phosphate) 75 Mg Cap 75 Mg PO BID 5 Days


Hydrochlorothiazide 50 Mg Tab 50 Mg PO DAILY


Reported


Gabapentin 100 Mg Cap 100 Mg PO TID








Review of Systems


Except as stated in HPI:  all other systems reviewed are Neg


General / Constitutional:  Positive: Fever


Eyes:  No: Visual changes


HENT:  Positive: Headaches, Sore Throat, Rhinitis, Rhinorrhea, Congestion, No: 

Vertigo, Lightheadedness, Nosebleed, Neck Stiffness, Neck Pain, Dental 

Difficulties, Earache


Cardiovascular:  No: Chest Pain or Discomfort


Respiratory:  Positive: Cough, No: Shortness of Breath, Wheezing


Gastrointestinal:  No: Nausea, Vomiting, Diarrhea, Abdominal Pain


Genitourinary:  No: Dysuria


Musculoskeletal:  Positive: Myalgias, No: Arthralgias, Limited ROM, Pain


Skin:  No Rash


Neurologic:  No: Weakness


Psychiatric:  No: Depression


Endocrine:  No: Polydipsia


Hematologic/Lymphatic:  No: Easy Bruising





Physical Exam


Narrative


GENERAL: He appears ill but not septic.    


SKIN: Warm and dry.


HEAD: Atraumatic. Normocephalic. 


EYES: Pupils equal and round. No scleral icterus. No injection or drainage. 


ENT: No nasal bleeding with moderate clear nasal discharge.  Mucous membranes 

pink and moist.  TMs are dull bilaterally but no injection.  Pharynx is mildly 

erythematous without significant swelling or tonsillitis.  Uvula is midline.


NECK: Trachea midline. No JVD.  Supple nontender


CARDIOVASCULAR: Regular rate and rhythm.  


RESPIRATORY: No accessory muscle use. Clear to auscultation. Breath sounds 

equal bilaterally. 


GASTROINTESTINAL: Abdomen soft, non-tender, nondistended. Hepatic and splenic 

margins not palpable. 


MUSCULOSKELETAL: Extremities without clubbing, cyanosis, or edema. No obvious 

deformities. 


NEUROLOGICAL: Awake and alert. No obvious cranial nerve deficits.  Motor 

grossly within normal limits. Five out of 5 muscle strength in the arms and 

legs.  Normal speech.


PSYCHIATRIC: Appropriate mood and affect; insight and judgment normal.





Data


Data


Last Documented VS





Vital Signs








  Date Time  Temp Pulse Resp B/P (MAP) Pulse Ox O2 Delivery O2 Flow Rate FiO2


 


18 08:18 98.2 73 18 141/85 (103) 99 Room Air  











MDM


Medical Decision Making


Medical Screen Exam Complete:  Yes


Emergency Medical Condition:  Yes


Differential Diagnosis


Febrile illness.  Viral syndrome.  Influenza.


Narrative Course


Patient is presumed to have H1N1 influenza.


Patient treated with Tamiflu 75 mg twice a day 5 days.


Take Tylenol, ibuprofen, and push fluids as discussed.


No for work given.


Follow up if symptoms worsen as needed.





Diagnosis





 Primary Impression:  


 H1N1 influenza


Referrals:  


Primary Care Physician


Patient Instructions:  General Instructions


Departure Forms:  Work Release   


   Special Instructions:  Patient has the flu.


      Patient should not return to work until fever free for 24 hours.





***Additional Instructions:  


Patient is presumed to have H1N1 influenza.


Patient treated with Tamiflu 75 mg twice a day 5 days.


Take Tylenol, ibuprofen, and push fluids as discussed.


No for work given.


Follow up if symptoms worsen as needed.


***Med/Other Pt SpecificInfo:  Prescription(s) given


Scripts


Oseltamivir (Tamiflu) 75 Mg Cap


75 MG PO BID for Mgmt Viral Infection for 5 Days, #10 CAP 0 Refills


   Prov: Jhon Rain MD         18


Disposition:  01 DISCHARGE HOME


Condition:  Stable











Juan Pablo Hunter 2018 08:44

## 2018-04-26 ENCOUNTER — HOSPITAL ENCOUNTER (EMERGENCY)
Dept: HOSPITAL 17 - NEPD | Age: 43
Discharge: HOME | End: 2018-04-26
Payer: COMMERCIAL

## 2018-04-26 VITALS
HEART RATE: 82 BPM | RESPIRATION RATE: 18 BRPM | OXYGEN SATURATION: 100 % | DIASTOLIC BLOOD PRESSURE: 87 MMHG | TEMPERATURE: 98.8 F | SYSTOLIC BLOOD PRESSURE: 138 MMHG

## 2018-04-26 VITALS — WEIGHT: 176.37 LBS | BODY MASS INDEX: 24.69 KG/M2 | HEIGHT: 71 IN

## 2018-04-26 DIAGNOSIS — Z34.91: ICD-10-CM

## 2018-04-26 DIAGNOSIS — Z3A.10: ICD-10-CM

## 2018-04-26 DIAGNOSIS — Z86.69: ICD-10-CM

## 2018-04-26 DIAGNOSIS — Z79.899: ICD-10-CM

## 2018-04-26 DIAGNOSIS — O26.891: Primary | ICD-10-CM

## 2018-04-26 DIAGNOSIS — Z88.8: ICD-10-CM

## 2018-04-26 DIAGNOSIS — R10.12: ICD-10-CM

## 2018-04-26 LAB
BACTERIA #/AREA URNS HPF: (no result) /HPF
COLOR UR: YELLOW
GLUCOSE UR STRIP-MCNC: (no result) MG/DL
HGB UR QL STRIP: (no result)
HYALINE CASTS #/AREA URNS LPF: 2 /LPF
KETONES UR STRIP-MCNC: (no result) MG/DL
MUCOUS THREADS #/AREA URNS LPF: (no result) /LPF
NITRITE UR QL STRIP: (no result)
SP GR UR STRIP: 1.02 (ref 1–1.03)
SQUAMOUS #/AREA URNS HPF: 5 /HPF (ref 0–5)
URINE LEUKOCYTE ESTERASE: (no result)

## 2018-04-26 PROCEDURE — 87591 N.GONORRHOEAE DNA AMP PROB: CPT

## 2018-04-26 PROCEDURE — 81001 URINALYSIS AUTO W/SCOPE: CPT

## 2018-04-26 PROCEDURE — 87491 CHLMYD TRACH DNA AMP PROBE: CPT

## 2018-04-26 PROCEDURE — 76700 US EXAM ABDOM COMPLETE: CPT

## 2018-04-26 PROCEDURE — 84703 CHORIONIC GONADOTROPIN ASSAY: CPT

## 2018-04-26 PROCEDURE — 84702 CHORIONIC GONADOTROPIN TEST: CPT

## 2018-04-26 PROCEDURE — 87210 SMEAR WET MOUNT SALINE/INK: CPT

## 2018-04-26 NOTE — PD
HPI


.


Abdominal cramping


Chief Complaint:  Pregnancy Related Problem


Time Seen by Provider:  12:44


Travel History


International Travel<30 days:  No


Contact w/Intl Traveler<30days:  No


Traveled to known affect area:  No





History of Present Illness


HPI


Patient presents reporting that she is pregnant and that she is having 

abdominal cramping.  She points to her left upper quadrant when asked to 

describe where her pain is.  Symptom onset was 1 week ago.  Description of 

symptoms as cramping.  Severity is mild.  Progression is getting worse.  No 

associated vaginal bleeding.  No discharge.  No urinary tract symptoms.





PFSH


Past Medical History


Cancer:  No


Cardiovascular Problems:  No


Diminished Hearing:  No


Endocrine:  No


Gastrointestinal Disorders:  Yes (gastritis)


Genitourinary:  No


Immune Disorder:  No


Implanted Vascular Access Dvce:  No


Musculoskeletal:  No


Neurologic:  Yes (seizures )


Psychiatric:  No


Reproductive:  No


Respiratory:  No


Immunizations Current:  No


Migraines:  Yes


Seizures:  Yes


Pregnant?:  Pregnant


LMP:  3/2018


:  3


Para:  1


Miscarriage:  1


:  1


Dilation and Curettage (D&C):  Yes





Past Surgical History


Pacemaker:  No


Other Surgery:  Yes (RT HAND SURGERY)





Social History


Alcohol Use:  No


Tobacco Use:  No


Substance Use:  No





Allergies-Medications


(Allergen,Severity, Reaction):  


Coded Allergies:  


     chlorpromazine (Unverified  Allergy, Severe, DYSTONIC, 18)


     prochlorperazine (Unverified  Adverse Reaction, Severe, DYSTONIC, 18)


Reported Meds & Prescriptions





Reported Meds & Active Scripts


Active


Tamiflu (Oseltamivir Phosphate) 75 Mg Cap 75 Mg PO BID 5 Days


Hydrochlorothiazide 50 Mg Tab 50 Mg PO DAILY


Reported


Gabapentin 100 Mg Cap 100 Mg PO TID








Review of Systems


Except as stated in HPI:  all other systems reviewed are Neg





Physical Exam


Narrative


GENERAL: Awake and alert and in no distress.


SKIN:  warm/dry.


HEAD: Normocephalic.  Atraumatic.


EYES: Pupils equal and round. No scleral icterus. No injection or drainage. 


ENT: No nasal bleeding or discharge.  Mucous membranes pink and moist.


NECK: Full range of motion without pain.. 


CARDIOVASCULAR: Regular rate and rhythm.  


RESPIRATORY: No accessory muscle use. Clear to auscultation. Breath sounds 

equal bilaterally. 


GASTROINTESTINAL: Abdomen soft.  Nontender.  Bowel sounds present.  

Nondistended.


: Normal female external genitalia.  Physiological discharge in the vaginal 

vault.  Cervical loss is closed.  She started complaining with tenderness on 

bimanual exam before my fingers touch her cervix.  She reports bilateral 

adnexal tenderness.


MUSCULOSKELETAL: No obvious deformities. 


NEUROLOGICAL: Awake and alert. No obvious cranial nerve deficits.  Motor 

grossly within normal limits. Normal speech.


PSYCHIATRIC: Appropriate mood and affect; insight and judgment normal.





Data


Data


Last Documented VS





Vital Signs








  Date Time  Temp Pulse Resp B/P (MAP) Pulse Ox O2 Delivery O2 Flow Rate FiO2


 


18 11:40 98.8 82 18 138/87 (104) 100   








Orders





 Orders


Beta Hcg (Quant/Titer) (18 12:44)


Gc And Chlamydia Pcr (18 12:54)


Wet Prep Profile (18 12:54)


Urinalysis - C+S If Indicated (18 12:54)


Ed Urine Pregnancytest Poc (18 12:54)


Us Pelvis (Ques Preg/Ectopic) (18 13:30)


Ed Discharge Order (18 15:11)





Labs





Laboratory Tests








Test


  18


13:00 18


13:26


 


Urine Color YELLOW  


 


Urine Turbidity CLEAR  


 


Urine pH 6.0  


 


Urine Specific Gravity 1.022  


 


Urine Protein TRACE mg/dL  


 


Urine Glucose (UA) NEG mg/dL  


 


Urine Ketones NEG mg/dL  


 


Urine Occult Blood NEG  


 


Urine Nitrite NEG  


 


Urine Bilirubin NEG  


 


Urine Urobilinogen


  LESS THAN 2.0


MG/DL 


 


 


Urine Leukocyte Esterase NEG  


 


Urine RBC


  LESS THAN 1


/hpf 


 


 


Urine WBC 1 /hpf  


 


Urine Squamous Epithelial


Cells 5 /hpf 


  


 


 


Urine Bacteria RARE /hpf  


 


Urine Hyaline Casts 2 /lpf  


 


Urine Mucus FEW /lpf  


 


Microscopic Urinalysis Comment


  CULT NOT


INDICATED 


 


 


Human Chorionic Gonadotropin,


Quant 85321 MIU/ML 


  


 


 


Chlamydia trachomatis DNA


(PCR) NOT DETECTED 


  


 


 


Neisseria gonorrhoeae DNA


(PCR) NOT DETECTED 


  


 


 


Clue Cells (Wet Prep)  NONE SEEN 


 


Vaginal Trichomonas (Wet Prep)  NONE SEEN 


 


Vaginal Yeast (Wet Prep)  NONE SEEN 











MDM


Medical Decision Making


Medical Screen Exam Complete:  Yes


Emergency Medical Condition:  Yes


Differential Diagnosis


Differential diagnosis of pelvic pain includes but is not limited to UTI, PID, 

ectopic pregnancy, spontaneous AB, constipation, viral illness


Narrative Course


This patient presents complaining with pelvic pain in early pregnancy.  

Ultrasound and quantitative hCG have been ordered.  She is not bleeding.





Quantitative hCG 92,000





Wet prep negative





UA negative








Last Impressions








Pelvis Ultrasound 18 1330 Signed





Impressions: 





 Service Date/Time:   13:48 - CONCLUSION:  Unremarkable 





 intrauterine gestational sac. Corresponds with approximate 10 week gestation. 





 Fetal heart rate of 156 beats minute.     Chalo Castaneda MD 





This patient has a viable IUP.  She is stable for discharge to home.





Diagnosis





 Primary Impression:  


 Abdominal pain affecting pregnancy


Disposition:   DISCHARGE HOME


Condition:  Stable











Georgiana Krishnan MD 2018 13:54

## 2018-04-26 NOTE — RADRPT
EXAM DATE/TIME:  2018 13:48 

 

[facility] COMPARISON:     

No previous studies available for comparison.

        

 

 

INDICATIONS :     

Pelvic pain during pregnancy. 

                     

 

LAB(S):     

 

Beta-hC

                     

 

MEDICAL HISTORY :           

Seizures. Dizziness. Migraine. Gastritis. 

 

SURGICAL HISTORY :          

Dilation and curettage. Right hand surgery.

 

ENCOUNTER:     

Initial

 

ACUITY:     

1 week

 

PAIN SCORE:     

2/10

 

LOCATION:     

Bilateral pelvis 

                     

MEASUREMENTS:     

 

UTERUS:                                  

9.1 x 8.9 x 6.5 cm 

 

ENDOMETRIAL STRIPE:      

>20 mm

 

RIGHT OVARY:                      

2.5 x 2.1 x 1.4  cm 

 

LEFT OVARY:                         

4.0 x 2.3 x 1.5 cm  

 

FREE FLUID:                           

No  

 

CROWN RUMP LENGTH:     

3.6 = 10 WKS 4 DAYS

 

FHR:                                         

156 BPM

 

FINDINGS:     

 

UTERUS:     

The myometrium has homogeneous echotexture without mass. There is a positive intrauterine gestation m
easuring 3.6 cm, corresponding with a 10 week 4 day gestation. The gestational sac measures 4.9 x 3.3
 x 5.1 cm corresponds with this 9 week 6 day gestation 

 

RIGHT OVARY:     

Ovary contains no mass or significant cystic lesion.  

 

LEFT OVARY:     

Ovary contains no mass or significant cystic lesion.  

 

MISCELLANEOUS:     

No free fluid.  

 

CONCLUSION:     

Unremarkable intrauterine gestational sac. Corresponds with approximate 10 week gestation. Fetal hear
t rate of 156 beats minute.

 

 

 

 Chalo Castaneda MD on 2018 at 15:12           

Board Certified Radiologist.

 This report was verified electronically.

## 2020-06-09 NOTE — MD
cc:

LISA ENAMORADO MD

****

 

 

 

ADMISSION DATE: 3/29/2017    DISCHARGE DATE:  2017

 

ADMISSION DIAGNOSIS

1. Term pregnancy

2. Advanced maternal age, 41

3. 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

 

DISCHARGE DIAGNOSIS

1.   Term pregnancy

2. Advanced maternal age, 41

3. 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

4. Delivered.

 

PROCEDURE

Primary low transverse  section on 2017

 

HISTORY OF PRESENT ILLNESS

The patient is a 41-year-old single black female, para 1-0-3-1, with an LMP of

16, EDC of 17.  Her prenatal course was benign.  Her vital parameters,

she is 4'11", with small shoe size.  She had difficulty with a five pound

delivery in .  This EFW was felt to be 1000 grams higher with a floating

vertex.

She was admitted for primary  on 2017 and had a viable vigorous

female, Apgars were 9 and 9, 6 pounds, 13 ounces, OP presentation.  Postop she

did well with gradual diet and activity.

 

DISCHARGE INSTRUCTIONS:

She was discharged home on 2017, advised NPV, light activity, no driving.

Return to see me in one week.  She is to call for abnormal pain, bleeding,

temperature, signs of infection or depression.

 

DISCHARGE MEDICATIONS:

She was given a prescription for Percocet 5, one to two p.o. q4 hours p.r.n.

pain #60.  She was advised to take her routine vitamins at home, routine

medications.

 

 

 

Return to see me in one week and call for any abnormal symptoms.

 

 

 

                              _________________________________

                              Eliot Enamorado MD

 

 

 

[facility]

D:  2017/12:43 PM

T:  2017/5:36 AM

Visit #:  [id number]

Job #:  [id number]...